# Patient Record
Sex: MALE | Race: WHITE | NOT HISPANIC OR LATINO | Employment: OTHER | URBAN - METROPOLITAN AREA
[De-identification: names, ages, dates, MRNs, and addresses within clinical notes are randomized per-mention and may not be internally consistent; named-entity substitution may affect disease eponyms.]

---

## 2023-07-10 ENCOUNTER — APPOINTMENT (OUTPATIENT)
Dept: PRIMARY CARE | Facility: CLINIC | Age: 63
End: 2023-07-10
Payer: COMMERCIAL

## 2023-10-03 DIAGNOSIS — E78.5 HYPERLIPIDEMIA, UNSPECIFIED HYPERLIPIDEMIA TYPE: ICD-10-CM

## 2023-10-03 PROBLEM — H66.90 OTITIS MEDIA: Status: ACTIVE | Noted: 2023-10-03

## 2023-10-03 PROBLEM — T14.8XXA MUSCLE STRAIN: Status: ACTIVE | Noted: 2023-10-03

## 2023-10-03 PROBLEM — M25.729 OLECRANON BONE SPUR: Status: ACTIVE | Noted: 2023-10-03

## 2023-10-03 PROBLEM — M54.32 SCIATICA OF LEFT SIDE: Status: ACTIVE | Noted: 2023-10-03

## 2023-10-03 PROBLEM — R11.0 CHRONIC NAUSEA: Status: ACTIVE | Noted: 2023-10-03

## 2023-10-03 PROBLEM — R06.02 SOB (SHORTNESS OF BREATH): Status: ACTIVE | Noted: 2023-10-03

## 2023-10-03 PROBLEM — E78.2 MIXED HYPERLIPIDEMIA: Status: ACTIVE | Noted: 2023-10-03

## 2023-10-03 PROBLEM — T78.40XA ALLERGY: Status: ACTIVE | Noted: 2023-10-03

## 2023-10-03 PROBLEM — G47.30 SLEEP APNEA: Status: ACTIVE | Noted: 2023-10-03

## 2023-10-03 PROBLEM — M54.30 SCIATICA: Status: ACTIVE | Noted: 2023-10-03

## 2023-10-03 PROBLEM — E55.9 VITAMIN D DEFICIENCY: Status: ACTIVE | Noted: 2023-10-03

## 2023-10-03 PROBLEM — J20.9 ACUTE BRONCHITIS: Status: ACTIVE | Noted: 2023-10-03

## 2023-10-03 PROBLEM — G47.00 INSOMNIA: Status: ACTIVE | Noted: 2023-10-03

## 2023-10-03 PROBLEM — M70.22 OLECRANON BURSITIS OF LEFT ELBOW: Status: ACTIVE | Noted: 2023-10-03

## 2023-10-03 PROBLEM — M25.512 LEFT SHOULDER PAIN: Status: ACTIVE | Noted: 2023-10-03

## 2023-10-03 PROBLEM — M54.50 LOW BACK PAIN: Status: ACTIVE | Noted: 2023-10-03

## 2023-10-03 PROBLEM — H10.30 ACUTE CONJUNCTIVITIS: Status: ACTIVE | Noted: 2023-10-03

## 2023-10-03 PROBLEM — S42.402A LEFT ELBOW FRACTURE: Status: ACTIVE | Noted: 2023-10-03

## 2023-10-03 PROBLEM — I10 HYPERTENSION: Status: ACTIVE | Noted: 2023-10-03

## 2023-10-03 PROBLEM — J01.90 ACUTE SINUSITIS: Status: ACTIVE | Noted: 2023-10-03

## 2023-10-03 PROBLEM — M25.521 PAIN OF BOTH ELBOWS: Status: ACTIVE | Noted: 2023-10-03

## 2023-10-03 PROBLEM — M25.522 PAIN OF BOTH ELBOWS: Status: ACTIVE | Noted: 2023-10-03

## 2023-10-03 PROBLEM — R73.9 HYPERGLYCEMIA: Status: ACTIVE | Noted: 2023-10-03

## 2023-10-03 RX ORDER — SIMVASTATIN 10 MG/1
10 TABLET, FILM COATED ORAL NIGHTLY
Qty: 90 TABLET | Refills: 3 | Status: SHIPPED | OUTPATIENT
Start: 2023-10-03

## 2023-10-03 RX ORDER — SIMVASTATIN 10 MG/1
10 TABLET, FILM COATED ORAL NIGHTLY
COMMUNITY
Start: 2016-07-18 | End: 2023-10-03 | Stop reason: SDUPTHER

## 2023-10-16 PROBLEM — J20.9 ACUTE BRONCHITIS: Status: RESOLVED | Noted: 2023-10-03 | Resolved: 2023-10-16

## 2023-10-16 PROBLEM — J01.90 ACUTE SINUSITIS: Status: RESOLVED | Noted: 2023-10-03 | Resolved: 2023-10-16

## 2023-10-16 PROBLEM — H10.30 ACUTE CONJUNCTIVITIS: Status: RESOLVED | Noted: 2023-10-03 | Resolved: 2023-10-16

## 2023-10-16 RX ORDER — CHOLECALCIFEROL (VITAMIN D3) 25 MCG
1 TABLET ORAL DAILY
COMMUNITY
Start: 2016-07-18 | End: 2023-10-31 | Stop reason: ALTCHOICE

## 2023-10-16 RX ORDER — ONDANSETRON 4 MG/1
TABLET, FILM COATED ORAL
COMMUNITY
Start: 2023-06-03 | End: 2023-10-31 | Stop reason: ALTCHOICE

## 2023-10-16 RX ORDER — TRAMADOL HYDROCHLORIDE 50 MG/1
TABLET ORAL
COMMUNITY
Start: 2023-01-25 | End: 2023-10-31 | Stop reason: ALTCHOICE

## 2023-10-16 RX ORDER — OMEPRAZOLE 40 MG/1
40 CAPSULE, DELAYED RELEASE ORAL DAILY
COMMUNITY
End: 2024-01-23

## 2023-10-16 RX ORDER — LISINOPRIL 10 MG/1
1 TABLET ORAL
COMMUNITY
Start: 2015-10-26 | End: 2024-01-24

## 2023-10-16 RX ORDER — NAPROXEN 500 MG/1
1 TABLET ORAL EVERY 12 HOURS PRN
COMMUNITY
Start: 2023-01-26 | End: 2023-10-31 | Stop reason: ALTCHOICE

## 2023-10-16 RX ORDER — TALC
POWDER (GRAM) TOPICAL
COMMUNITY
Start: 2019-02-25 | End: 2023-10-31 | Stop reason: ALTCHOICE

## 2023-10-16 RX ORDER — CYCLOBENZAPRINE HCL 10 MG
10 TABLET ORAL EVERY 6 HOURS PRN
COMMUNITY
End: 2023-10-31 | Stop reason: ALTCHOICE

## 2023-10-16 RX ORDER — HYDROCHLOROTHIAZIDE 12.5 MG/1
12.5 TABLET ORAL DAILY
COMMUNITY
End: 2024-01-24

## 2023-10-19 ENCOUNTER — CLINICAL SUPPORT (OUTPATIENT)
Dept: PRIMARY CARE | Facility: CLINIC | Age: 63
End: 2023-10-19
Payer: COMMERCIAL

## 2023-10-19 DIAGNOSIS — D50.9 IRON DEFICIENCY ANEMIA, UNSPECIFIED IRON DEFICIENCY ANEMIA TYPE: ICD-10-CM

## 2023-10-19 DIAGNOSIS — Z00.00 ENCOUNTER FOR ANNUAL PHYSICAL EXAM: ICD-10-CM

## 2023-10-19 DIAGNOSIS — E78.00 ELEVATED LDL CHOLESTEROL LEVEL: ICD-10-CM

## 2023-10-19 DIAGNOSIS — Z12.5 SCREENING PSA (PROSTATE SPECIFIC ANTIGEN): ICD-10-CM

## 2023-10-19 DIAGNOSIS — E78.5 HYPERLIPIDEMIA, UNSPECIFIED HYPERLIPIDEMIA TYPE: ICD-10-CM

## 2023-10-19 DIAGNOSIS — E03.9 HYPOTHYROIDISM, UNSPECIFIED TYPE: ICD-10-CM

## 2023-10-19 DIAGNOSIS — E55.9 VITAMIN D DEFICIENCY: ICD-10-CM

## 2023-10-19 DIAGNOSIS — I10 BENIGN ESSENTIAL HYPERTENSION: ICD-10-CM

## 2023-10-19 DIAGNOSIS — I10 PRIMARY HYPERTENSION: ICD-10-CM

## 2023-10-19 DIAGNOSIS — R73.9 HYPERGLYCEMIA: ICD-10-CM

## 2023-10-19 LAB
25(OH)D3 SERPL-MCNC: 41 NG/ML (ref 30–100)
ALBUMIN SERPL BCP-MCNC: 4.5 G/DL (ref 3.4–5)
ALP SERPL-CCNC: 63 U/L (ref 33–136)
ALT SERPL W P-5'-P-CCNC: 22 U/L (ref 10–52)
ANION GAP SERPL CALC-SCNC: 18 MMOL/L (ref 10–20)
AST SERPL W P-5'-P-CCNC: 21 U/L (ref 9–39)
BILIRUB SERPL-MCNC: 1.1 MG/DL (ref 0–1.2)
BUN SERPL-MCNC: 21 MG/DL (ref 6–23)
CALCIUM SERPL-MCNC: 9.7 MG/DL (ref 8.6–10.6)
CHLORIDE SERPL-SCNC: 100 MMOL/L (ref 98–107)
CHOLEST SERPL-MCNC: 211 MG/DL (ref 0–199)
CHOLESTEROL/HDL RATIO: 4
CO2 SERPL-SCNC: 25 MMOL/L (ref 21–32)
CREAT SERPL-MCNC: 1.31 MG/DL (ref 0.5–1.3)
ERYTHROCYTE [DISTWIDTH] IN BLOOD BY AUTOMATED COUNT: 12.1 % (ref 11.5–14.5)
EST. AVERAGE GLUCOSE BLD GHB EST-MCNC: 134 MG/DL
GFR SERPL CREATININE-BSD FRML MDRD: 61 ML/MIN/1.73M*2
GLUCOSE SERPL-MCNC: 94 MG/DL (ref 74–99)
HBA1C MFR BLD: 6.3 %
HCT VFR BLD AUTO: 41.4 % (ref 41–52)
HDLC SERPL-MCNC: 53 MG/DL
HGB BLD-MCNC: 12.7 G/DL (ref 13.5–17.5)
LDLC SERPL CALC-MCNC: 132 MG/DL
MCH RBC QN AUTO: 30.6 PG (ref 26–34)
MCHC RBC AUTO-ENTMCNC: 30.7 G/DL (ref 32–36)
MCV RBC AUTO: 100 FL (ref 80–100)
NON HDL CHOLESTEROL: 158 MG/DL (ref 0–149)
NRBC BLD-RTO: 0 /100 WBCS (ref 0–0)
PLATELET # BLD AUTO: 307 X10*3/UL (ref 150–450)
PMV BLD AUTO: 9.2 FL (ref 7.5–11.5)
POTASSIUM SERPL-SCNC: 4.2 MMOL/L (ref 3.5–5.3)
PROT SERPL-MCNC: 7 G/DL (ref 6.4–8.2)
PSA SERPL-MCNC: 1.21 NG/ML
RBC # BLD AUTO: 4.15 X10*6/UL (ref 4.5–5.9)
SODIUM SERPL-SCNC: 139 MMOL/L (ref 136–145)
TRIGL SERPL-MCNC: 131 MG/DL (ref 0–149)
TSH SERPL-ACNC: 2.45 MIU/L (ref 0.44–3.98)
VLDL: 26 MG/DL (ref 0–40)
WBC # BLD AUTO: 8.9 X10*3/UL (ref 4.4–11.3)

## 2023-10-19 PROCEDURE — 84443 ASSAY THYROID STIM HORMONE: CPT

## 2023-10-19 PROCEDURE — 80061 LIPID PANEL: CPT

## 2023-10-19 PROCEDURE — 36415 COLL VENOUS BLD VENIPUNCTURE: CPT

## 2023-10-19 PROCEDURE — 82306 VITAMIN D 25 HYDROXY: CPT

## 2023-10-19 PROCEDURE — 80053 COMPREHEN METABOLIC PANEL: CPT

## 2023-10-19 PROCEDURE — 85027 COMPLETE CBC AUTOMATED: CPT

## 2023-10-19 PROCEDURE — 84153 ASSAY OF PSA TOTAL: CPT

## 2023-10-19 PROCEDURE — 83036 HEMOGLOBIN GLYCOSYLATED A1C: CPT

## 2023-10-24 ENCOUNTER — OFFICE VISIT (OUTPATIENT)
Dept: ORTHOPEDIC SURGERY | Facility: HOSPITAL | Age: 63
End: 2023-10-24
Payer: COMMERCIAL

## 2023-10-24 DIAGNOSIS — M25.511 CHRONIC RIGHT SHOULDER PAIN: Primary | ICD-10-CM

## 2023-10-24 DIAGNOSIS — M25.512 CHRONIC LEFT SHOULDER PAIN: ICD-10-CM

## 2023-10-24 DIAGNOSIS — G89.29 CHRONIC LEFT SHOULDER PAIN: ICD-10-CM

## 2023-10-24 DIAGNOSIS — G89.29 CHRONIC RIGHT SHOULDER PAIN: Primary | ICD-10-CM

## 2023-10-24 PROCEDURE — 99213 OFFICE O/P EST LOW 20 MIN: CPT | Mod: 25 | Performed by: ORTHOPAEDIC SURGERY

## 2023-10-24 PROCEDURE — 20610 DRAIN/INJ JOINT/BURSA W/O US: CPT | Mod: LT | Performed by: ORTHOPAEDIC SURGERY

## 2023-10-24 PROCEDURE — 99213 OFFICE O/P EST LOW 20 MIN: CPT | Performed by: ORTHOPAEDIC SURGERY

## 2023-10-24 PROCEDURE — 20610 DRAIN/INJ JOINT/BURSA W/O US: CPT | Mod: RT | Performed by: ORTHOPAEDIC SURGERY

## 2023-10-24 ASSESSMENT — PAIN - FUNCTIONAL ASSESSMENT: PAIN_FUNCTIONAL_ASSESSMENT: 0-10

## 2023-10-24 ASSESSMENT — PAIN SCALES - GENERAL: PAINLEVEL_OUTOF10: 5 - MODERATE PAIN

## 2023-10-24 NOTE — PROGRESS NOTES
Subjective    Patient ID: Jaya Kevin is a 63 y.o. male.    Chief Complaint: Follow-up of the Right Shoulder (injection) and Follow-up of the Left Shoulder (injection)     Last Surgery: No surgery found  Last Surgery Date: No surgery found    The patient is a pleasant 62 year-old male presenting today for evaluation of their left elbow. He also reports that he is having extreme pain in his right elbow, and bilateral shoulders as well. He is currently employed as a .     10/24/23  Jaya returns to the clinic today for a repeat follow up visit regarding his bilateral shoulders.     He is here today for repeat injections as they have worn off. He is still happy with the relief he is getting from them and requested 2 more today.         Past medical history, surgical history, social history, and family history were all reviewed and are as per the [place] patient health history questionnaire form that I signed and scanned into the chart today.Â           Objective   Patient is a well-developed, well-nourished male in no acute distress.Â Breathes with normal chest rises.Â Pupils are round and symmetric today.Â Awake, alert, and oriented x3.    Examination of the right shoulder today reveals the skin to be intact. There is no sign any atrophy lesions or abrasions. There is no pain to palpation of the bony prominences. Cervical lymphadenopathy examined, and this was negative.   Patient had 5 out of 5 wrist flexors extension, thumb extension, bilaterally. Sensation was intact to light touch to median, ulnar, radial, axillary, and musculocutaneous nerves bilaterally. Positive radial pulse bilaterally. Provocative maneuvers on the right side today were negative.   Range of motion of the right shoulder revealed 0-170Â° of forward elevation. 50-60Â° of external rotation. And internal rotation up to the sacrum     Examination of the left shoulder today reveals the skin to be intact. There is no sign any atrophy  lesions or abrasions. There is no pain to palpation of the bony prominences. Cervical lymphadenopathy examined, and this was negative.   Patient had 5 out of 5 wrist flexors extension, thumb extension bilaterally. Sensation was intact to light touch to median, ulnar, radial axillary, and musculocutaneous nerves bilaterally. Positive radial pulse bilaterally. Provocative maneuvers on the left side today were negative.   Range of motion of the left shoulder revealed 0-170Â° of forward elevation. 0-45Â° of external rotation. Internal rotation was to sacrum.     Image Results:    Patient ID: Jaya Kevin is a 63 y.o. male.    L Inj/Asp: L subacromial bursa on 10/24/2023 4:30 PM  Indications: pain  Details: 20 G needle, anterior approach  Outcome: tolerated well, no immediate complications  Procedure, treatment alternatives, risks and benefits explained, specific risks discussed. Consent was given by the patient. Immediately prior to procedure a time out was called to verify the correct patient, procedure, equipment, support staff and site/side marked as required. Patient was prepped and draped in the usual sterile fashion.       L Inj/Asp: R subacromial bursa on 10/24/2023 4:31 PM  Indications: pain  Details: 20 G needle, anterior approach  Outcome: tolerated well, no immediate complications  Procedure, treatment alternatives, risks and benefits explained, specific risks discussed. Consent was given by the patient. Immediately prior to procedure a time out was called to verify the correct patient, procedure, equipment, support staff and site/side marked as required. Patient was prepped and draped in the usual sterile fashion.             Assessment/Plan   Encounter Diagnoses:  No diagnosis found.  Patient with bilateral rotator cuff disease    At this time, we had a very long discussion with the patient regarding the diagnosis. Rotator cuff disease is a spectrum of disorders ranging from rotator cuff tendinitis to  full-thickness rotator cuff tears. We know that some patients over the age of 50 will have symptomatic rotator cuff tears just due to overuse and general wear and tear. In general, most patients who come in with complaints such as these will get better with therapy and injections alone. The therapy should be performed 2-3 times a day and should take about 10-15 minutes to perform each time.       We did repeat injections for him today at his request. He tolerated these well. We will see him back in 3 months for consideration of repeat injections.   No orders of the defined types were placed in this encounter.    Follow up in 3 months    Scribe Attestation  By signing my name below, INusrat Scribe   attest that this documentation has been prepared under the direction and in the presence of Hesham Palacios MD.

## 2023-10-31 ENCOUNTER — OFFICE VISIT (OUTPATIENT)
Dept: PRIMARY CARE | Facility: CLINIC | Age: 63
End: 2023-10-31
Payer: COMMERCIAL

## 2023-10-31 VITALS
WEIGHT: 161 LBS | SYSTOLIC BLOOD PRESSURE: 120 MMHG | BODY MASS INDEX: 26.79 KG/M2 | DIASTOLIC BLOOD PRESSURE: 80 MMHG | OXYGEN SATURATION: 96 % | HEART RATE: 101 BPM | RESPIRATION RATE: 12 BRPM

## 2023-10-31 DIAGNOSIS — Z00.00 ENCOUNTER FOR ANNUAL PHYSICAL EXAM: Primary | ICD-10-CM

## 2023-10-31 DIAGNOSIS — E78.5 HYPERLIPIDEMIA, UNSPECIFIED HYPERLIPIDEMIA TYPE: ICD-10-CM

## 2023-10-31 DIAGNOSIS — E55.9 VITAMIN D DEFICIENCY: ICD-10-CM

## 2023-10-31 DIAGNOSIS — I10 HYPERTENSION, UNSPECIFIED TYPE: ICD-10-CM

## 2023-10-31 DIAGNOSIS — M54.32 SCIATICA OF LEFT SIDE: ICD-10-CM

## 2023-10-31 DIAGNOSIS — R73.9 HYPERGLYCEMIA: ICD-10-CM

## 2023-10-31 LAB
POC APPEARANCE, URINE: CLEAR
POC BILIRUBIN, URINE: NEGATIVE
POC BLOOD, URINE: NEGATIVE
POC COLOR, URINE: YELLOW
POC GLUCOSE, URINE: NEGATIVE MG/DL
POC KETONES, URINE: NEGATIVE MG/DL
POC LEUKOCYTES, URINE: NEGATIVE
POC NITRITE,URINE: NEGATIVE
POC OCCULT BLOOD STOOL #1: NEGATIVE
POC PH, URINE: 5 PH
POC PROTEIN, URINE: NEGATIVE MG/DL
POC SPECIFIC GRAVITY, URINE: 1.01
POC UROBILINOGEN, URINE: 0.2 EU/DL

## 2023-10-31 PROCEDURE — 99396 PREV VISIT EST AGE 40-64: CPT | Performed by: INTERNAL MEDICINE

## 2023-10-31 PROCEDURE — 93000 ELECTROCARDIOGRAM COMPLETE: CPT | Performed by: INTERNAL MEDICINE

## 2023-10-31 PROCEDURE — 82270 OCCULT BLOOD FECES: CPT | Performed by: INTERNAL MEDICINE

## 2023-10-31 PROCEDURE — 99214 OFFICE O/P EST MOD 30 MIN: CPT | Performed by: INTERNAL MEDICINE

## 2023-10-31 PROCEDURE — 3074F SYST BP LT 130 MM HG: CPT | Performed by: INTERNAL MEDICINE

## 2023-10-31 PROCEDURE — 81002 URINALYSIS NONAUTO W/O SCOPE: CPT | Performed by: INTERNAL MEDICINE

## 2023-10-31 PROCEDURE — 3079F DIAST BP 80-89 MM HG: CPT | Performed by: INTERNAL MEDICINE

## 2023-10-31 PROCEDURE — 1036F TOBACCO NON-USER: CPT | Performed by: INTERNAL MEDICINE

## 2023-10-31 RX ORDER — CYCLOBENZAPRINE HCL 5 MG
5 TABLET ORAL 3 TIMES DAILY PRN
Qty: 90 TABLET | Refills: 0 | Status: SHIPPED | OUTPATIENT
Start: 2023-10-31

## 2023-10-31 ASSESSMENT — ENCOUNTER SYMPTOMS
TREMORS: 0
OCCASIONAL FEELINGS OF UNSTEADINESS: 0
DIZZINESS: 0
COUGH: 0
CONSTIPATION: 0
SHORTNESS OF BREATH: 1
BLOOD IN STOOL: 0
HEADACHES: 0
SEIZURES: 0
DIARRHEA: 0
JOINT SWELLING: 0
VOMITING: 0
FEVER: 0
BACK PAIN: 1
LOSS OF SENSATION IN FEET: 0
DIFFICULTY URINATING: 0
HEMATURIA: 0
EYE PAIN: 0
PALPITATIONS: 0
CHILLS: 0
DYSURIA: 0
MYALGIAS: 1
DEPRESSION: 0
ABDOMINAL PAIN: 0
WHEEZING: 0

## 2023-10-31 ASSESSMENT — PATIENT HEALTH QUESTIONNAIRE - PHQ9
1. LITTLE INTEREST OR PLEASURE IN DOING THINGS: NOT AT ALL
2. FEELING DOWN, DEPRESSED OR HOPELESS: SEVERAL DAYS
SUM OF ALL RESPONSES TO PHQ9 QUESTIONS 1 AND 2: 1

## 2024-01-22 NOTE — PROGRESS NOTES
Subjective    Patient ID: Jaya Kevin is a 63 y.o. male.    Chief Complaint: No chief complaint on file.     Last Surgery: No surgery found  Last Surgery Date: No surgery found    The patient is a pleasant 62 year-old male presenting today for evaluation of their left elbow. He also reports that he is having extreme pain in his right elbow, and bilateral shoulders as well. He is currently employed as a .     10/24/23  Jaya returns to the clinic today for a repeat follow up visit regarding his bilateral shoulders.     He is here today for repeat injections as they have worn off. He is still happy with the relief he is getting from them and requested 2 more today.     01/23/24  Jaya returns to the clinic today for a follow up visit regarding his bilateral shoulders.     He reports that the injections continue to help enough that he would like to continue them.      Past medical history, surgical history, social history, and family history were all reviewed and are as per the [place] patient health history questionnaire form that I signed and scanned into the chart today.          Objective   Patient is a well-developed, well-nourished male in no acute distress.Â Breathes with normal chest rises.Â Pupils are round and symmetric today.Â Awake, alert, and oriented x3.    Examination of the right shoulder today reveals the skin to be intact. There is no sign any atrophy lesions or abrasions. There is no pain to palpation of the bony prominences. Cervical lymphadenopathy examined, and this was negative.   Patient had 5 out of 5 wrist flexors extension, thumb extension, bilaterally. Sensation was intact to light touch to median, ulnar, radial, axillary, and musculocutaneous nerves bilaterally. Positive radial pulse bilaterally. Provocative maneuvers on the right side today were negative.   Range of motion of the right shoulder revealed 0-170Â° of forward elevation. 50-60Â° of external rotation. And  internal rotation up to the sacrum     Examination of the left shoulder today reveals the skin to be intact. There is no sign any atrophy lesions or abrasions. There is no pain to palpation of the bony prominences. Cervical lymphadenopathy examined, and this was negative.   Patient had 5 out of 5 wrist flexors extension, thumb extension bilaterally. Sensation was intact to light touch to median, ulnar, radial axillary, and musculocutaneous nerves bilaterally. Positive radial pulse bilaterally. Provocative maneuvers on the left side today were negative.   Range of motion of the left shoulder revealed 0-170Â° of forward elevation. 0-45Â° of external rotation. Internal rotation was to sacrum.     Image Results:    Patient ID: Jaya Kevin is a 63 y.o. male.    L Inj/Asp: bilateral subacromial bursa on 1/23/2024 3:17 PM  Indications: pain  Details: 20 G needle, anterior approach  Outcome: tolerated well, no immediate complications  Procedure, treatment alternatives, risks and benefits explained, specific risks discussed. Consent was given by the patient. Immediately prior to procedure a time out was called to verify the correct patient, procedure, equipment, support staff and site/side marked as required. Patient was prepped and draped in the usual sterile fashion.             Assessment/Plan   Encounter Diagnoses:  No diagnosis found.  Patient with bilateral rotator cuff disease    At this time, we had a very long discussion with the patient regarding the diagnosis. Rotator cuff disease is a spectrum of disorders ranging from rotator cuff tendinitis to full-thickness rotator cuff tears. We know that some patients over the age of 50 will have symptomatic rotator cuff tears just due to overuse and general wear and tear. In general, most patients who come in with complaints such as these will get better with therapy and injections alone. The therapy should be performed 2-3 times a day and should take about 10-15  minutes to perform each time.       We did repeat injections for him today at his request. He tolerated these well. We will see him back in 3 months for consideration of repeat injections.   No orders of the defined types were placed in this encounter.    Follow up in 3 months    Scribe Attestation  By signing my name below, Nusrat IVERSON Scribe   attest that this documentation has been prepared under the direction and in the presence of Hesham Palacios MD.

## 2024-01-23 ENCOUNTER — OFFICE VISIT (OUTPATIENT)
Dept: ORTHOPEDIC SURGERY | Facility: HOSPITAL | Age: 64
End: 2024-01-23
Payer: COMMERCIAL

## 2024-01-23 DIAGNOSIS — Z00.00 ENCOUNTER FOR GENERAL ADULT MEDICAL EXAMINATION WITHOUT ABNORMAL FINDINGS: ICD-10-CM

## 2024-01-23 DIAGNOSIS — G89.29 CHRONIC LEFT SHOULDER PAIN: Primary | ICD-10-CM

## 2024-01-23 DIAGNOSIS — M25.512 CHRONIC LEFT SHOULDER PAIN: Primary | ICD-10-CM

## 2024-01-23 PROCEDURE — 99213 OFFICE O/P EST LOW 20 MIN: CPT | Performed by: ORTHOPAEDIC SURGERY

## 2024-01-23 PROCEDURE — 20610 DRAIN/INJ JOINT/BURSA W/O US: CPT | Performed by: ORTHOPAEDIC SURGERY

## 2024-01-23 PROCEDURE — 1036F TOBACCO NON-USER: CPT | Performed by: ORTHOPAEDIC SURGERY

## 2024-01-23 PROCEDURE — 99213 OFFICE O/P EST LOW 20 MIN: CPT | Mod: 27 | Performed by: ORTHOPAEDIC SURGERY

## 2024-01-23 RX ORDER — OMEPRAZOLE 40 MG/1
40 CAPSULE, DELAYED RELEASE ORAL DAILY
Qty: 90 CAPSULE | Refills: 3 | Status: SHIPPED | OUTPATIENT
Start: 2024-01-23

## 2024-01-23 ASSESSMENT — PAIN SCALES - GENERAL: PAINLEVEL_OUTOF10: 5 - MODERATE PAIN

## 2024-01-23 ASSESSMENT — PAIN DESCRIPTION - DESCRIPTORS: DESCRIPTORS: DISCOMFORT

## 2024-01-23 ASSESSMENT — PAIN - FUNCTIONAL ASSESSMENT: PAIN_FUNCTIONAL_ASSESSMENT: 0-10

## 2024-01-24 DIAGNOSIS — I10 ESSENTIAL (PRIMARY) HYPERTENSION: ICD-10-CM

## 2024-01-24 RX ORDER — HYDROCHLOROTHIAZIDE 12.5 MG/1
12.5 TABLET ORAL DAILY
Qty: 30 TABLET | Refills: 11 | Status: SHIPPED | OUTPATIENT
Start: 2024-01-24

## 2024-01-24 RX ORDER — LISINOPRIL 10 MG/1
10 TABLET ORAL DAILY
Qty: 90 TABLET | Refills: 11 | Status: SHIPPED | OUTPATIENT
Start: 2024-01-24

## 2024-04-28 NOTE — PROGRESS NOTES
Subjective    Patient ID: Jaya Kevin is a 64 y.o. male.    Chief Complaint: No chief complaint on file.     Last Surgery: No surgery found  Last Surgery Date: No surgery found    HPI  The patient is a pleasant 62 year-old male presenting today for evaluation of their left elbow. He also reports that he is having extreme pain in his right elbow, and bilateral shoulders as well. He is currently employed as a .     10/24/23  Jaya returns to the clinic today for a repeat follow up visit regarding his bilateral shoulders.     He is here today for repeat injections as they have worn off. He is still happy with the relief he is getting from them and requested 2 more today.     01/23/24  Jaya returns to the clinic today for a follow up visit regarding his bilateral shoulders.     He reports that the injections continue to help enough that he would like to continue them.     04/30/24  Jaya returns to the clinic today for a repeat follow up visit regarding his bilateral shoulders.     He reports was not seen today     Past medical history, surgical history, social history, and family history were all reviewed and are as per the [place] patient health history questionnaire form that I signed and scanned into the chart today.    Objective   Patient is a well-developed, well-nourished male in no acute distress.Â Breathes with normal chest rises.Â Pupils are round and symmetric today.Â Awake, alert, and oriented x3.    Examination of the right shoulder today reveals the skin to be intact. There is no sign any atrophy lesions or abrasions. There is no pain to palpation of the bony prominences. Cervical lymphadenopathy examined, and this was negative.   Patient had 5 out of 5 wrist flexors extension, thumb extension, bilaterally. Sensation was intact to light touch to median, ulnar, radial, axillary, and musculocutaneous nerves bilaterally. Positive radial pulse bilaterally. Provocative maneuvers on the  right side today were negative.   Range of motion of the right shoulder revealed 0-170Â° of forward elevation. 50-60Â° of external rotation. And internal rotation up to the sacrum     Examination of the left shoulder today reveals the skin to be intact. There is no sign any atrophy lesions or abrasions. There is no pain to palpation of the bony prominences. Cervical lymphadenopathy examined, and this was negative.   Patient had 5 out of 5 wrist flexors extension, thumb extension bilaterally. Sensation was intact to light touch to median, ulnar, radial axillary, and musculocutaneous nerves bilaterally. Positive radial pulse bilaterally. Provocative maneuvers on the left side today were negative.   Range of motion of the left shoulder revealed 0-170Â° of forward elevation. 0-45Â° of external rotation. Internal rotation was to sacrum.     Image Results:      Assessment/Plan   Encounter Diagnoses:  No diagnosis found.  Patient with bilateral rotator cuff disease    At this time, we had a very long discussion with the patient regarding the diagnosis. Rotator cuff disease is a spectrum of disorders ranging from rotator cuff tendinitis to full-thickness rotator cuff tears. We know that some patients over the age of 50 will have symptomatic rotator cuff tears just due to overuse and general wear and tear. In general, most patients who come in with complaints such as these will get better with therapy and injections alone. The therapy should be performed 2-3 times a day and should take about 10-15 minutes to perform each time.       We did repeat injections for him today at his request. He tolerated these well. We will see him back in 3 months for consideration of repeat injections.   No orders of the defined types were placed in this encounter.    Follow up in 3 months    Visit cancelled    Shi Attestation  By signing my name below, INusrat Scribe   attest that this documentation has been prepared under the  direction and in the presence of Hesham Palacios MD.

## 2024-04-30 ENCOUNTER — OFFICE VISIT (OUTPATIENT)
Dept: ORTHOPEDIC SURGERY | Facility: HOSPITAL | Age: 64
End: 2024-04-30
Payer: COMMERCIAL

## 2024-04-30 VITALS — WEIGHT: 160 LBS | HEIGHT: 64 IN | BODY MASS INDEX: 27.31 KG/M2

## 2024-04-30 DIAGNOSIS — M25.511 CHRONIC RIGHT SHOULDER PAIN: Primary | ICD-10-CM

## 2024-04-30 DIAGNOSIS — G89.29 CHRONIC RIGHT SHOULDER PAIN: Primary | ICD-10-CM

## 2024-04-30 PROCEDURE — 1125F AMNT PAIN NOTED PAIN PRSNT: CPT | Performed by: ORTHOPAEDIC SURGERY

## 2024-04-30 PROCEDURE — 1159F MED LIST DOCD IN RCRD: CPT | Performed by: ORTHOPAEDIC SURGERY

## 2024-04-30 ASSESSMENT — PAIN - FUNCTIONAL ASSESSMENT: PAIN_FUNCTIONAL_ASSESSMENT: 0-10

## 2024-04-30 ASSESSMENT — PAIN SCALES - GENERAL: PAINLEVEL_OUTOF10: 4

## 2024-10-09 DIAGNOSIS — E78.5 HYPERLIPIDEMIA, UNSPECIFIED HYPERLIPIDEMIA TYPE: ICD-10-CM

## 2024-10-10 RX ORDER — SIMVASTATIN 10 MG/1
10 TABLET, FILM COATED ORAL NIGHTLY
Qty: 90 TABLET | Refills: 3 | Status: SHIPPED | OUTPATIENT
Start: 2024-10-10